# Patient Record
Sex: MALE | Race: WHITE | NOT HISPANIC OR LATINO | Employment: FULL TIME | URBAN - METROPOLITAN AREA
[De-identification: names, ages, dates, MRNs, and addresses within clinical notes are randomized per-mention and may not be internally consistent; named-entity substitution may affect disease eponyms.]

---

## 2023-03-10 LAB — SARS-COV-2 RESULT: NOT DETECTED

## 2023-04-27 DIAGNOSIS — E78.2 MIXED HYPERLIPIDEMIA: Primary | ICD-10-CM

## 2023-04-27 LAB
ANION GAP IN SER/PLAS: 10 MMOL/L (ref 10–20)
CALCIUM (MG/DL) IN SER/PLAS: 9.1 MG/DL (ref 8.6–10.3)
CARBON DIOXIDE, TOTAL (MMOL/L) IN SER/PLAS: 27 MMOL/L (ref 21–32)
CHLORIDE (MMOL/L) IN SER/PLAS: 103 MMOL/L (ref 98–107)
CORTISOL (UG/DL) IN SERUM - AM: 13.9 UG/DL (ref 5–20)
CREATININE (MG/DL) IN SER/PLAS: 0.84 MG/DL (ref 0.5–1.3)
CREATININE (MG/DL) IN URINE: 100 MG/DL (ref 20–370)
GFR MALE: >90 ML/MIN/1.73M2
GLUCOSE (MG/DL) IN SER/PLAS: 91 MG/DL (ref 74–99)
OSMOLALITY, RANDOM URINE: 648 MOSM/KG (ref 200–1200)
OSMOLALITY, SERUM: 285 MOSM/KG H2O (ref 280–300)
POTASSIUM (MMOL/L) IN SER/PLAS: 3.9 MMOL/L (ref 3.5–5.3)
SODIUM (MMOL/L) IN SER/PLAS: 136 MMOL/L (ref 136–145)
SODIUM URINE RANDOM: 110 MMOL/L
SODIUM/CREATININE (MMOL/G) IN URINE: 110 MMOL/G CREAT
THYROTROPIN (MIU/L) IN SER/PLAS BY DETECTION LIMIT <= 0.05 MIU/L: 1.09 MIU/L (ref 0.44–3.98)
UREA NITROGEN (MG/DL) IN SER/PLAS: 17 MG/DL (ref 6–23)

## 2023-04-27 RX ORDER — ATORVASTATIN CALCIUM 10 MG/1
10 TABLET, FILM COATED ORAL NIGHTLY
Qty: 90 TABLET | Refills: 1 | Status: SHIPPED | OUTPATIENT
Start: 2023-04-27 | End: 2023-04-27

## 2023-05-25 LAB
APPEARANCE, URINE: CLEAR
BILIRUBIN, URINE: NEGATIVE
BLOOD, URINE: NEGATIVE
COLOR, URINE: YELLOW
GLUCOSE, URINE: NEGATIVE MG/DL
KETONES, URINE: NEGATIVE MG/DL
LEUKOCYTE ESTERASE, URINE: NEGATIVE
NITRITE, URINE: NEGATIVE
PH, URINE: 7 (ref 5–8)
PROTEIN, URINE: NEGATIVE MG/DL
SPECIFIC GRAVITY, URINE: 1.01 (ref 1–1.03)
UROBILINOGEN, URINE: <2 MG/DL (ref 0–1.9)

## 2023-09-10 LAB — SARS-COV-2 RESULT: DETECTED

## 2024-01-10 ENCOUNTER — LAB REQUISITION (OUTPATIENT)
Dept: LAB | Facility: HOSPITAL | Age: 64
End: 2024-01-10
Payer: COMMERCIAL

## 2024-01-10 LAB — SARS-COV-2 RNA RESP QL NAA+PROBE: DETECTED

## 2024-01-10 PROCEDURE — 87635 SARS-COV-2 COVID-19 AMP PRB: CPT

## 2024-04-01 ENCOUNTER — HOSPITAL ENCOUNTER (EMERGENCY)
Facility: HOSPITAL | Age: 64
Discharge: HOME | End: 2024-04-01
Payer: COMMERCIAL

## 2024-04-01 VITALS
RESPIRATION RATE: 17 BRPM | HEIGHT: 69 IN | WEIGHT: 150 LBS | DIASTOLIC BLOOD PRESSURE: 88 MMHG | HEART RATE: 70 BPM | BODY MASS INDEX: 22.22 KG/M2 | SYSTOLIC BLOOD PRESSURE: 129 MMHG | OXYGEN SATURATION: 100 % | TEMPERATURE: 97 F

## 2024-04-01 DIAGNOSIS — S61.012A LACERATION OF LEFT THUMB WITHOUT FOREIGN BODY WITHOUT DAMAGE TO NAIL, INITIAL ENCOUNTER: Primary | ICD-10-CM

## 2024-04-01 PROCEDURE — 2500000001 HC RX 250 WO HCPCS SELF ADMINISTERED DRUGS (ALT 637 FOR MEDICARE OP): Performed by: NURSE PRACTITIONER

## 2024-04-01 PROCEDURE — 2500000005 HC RX 250 GENERAL PHARMACY W/O HCPCS: Performed by: NURSE PRACTITIONER

## 2024-04-01 PROCEDURE — 99283 EMERGENCY DEPT VISIT LOW MDM: CPT | Mod: 25

## 2024-04-01 PROCEDURE — 12001 RPR S/N/AX/GEN/TRNK 2.5CM/<: CPT

## 2024-04-01 RX ORDER — BACITRACIN ZINC 500 UNIT/G
OINTMENT IN PACKET (EA) TOPICAL ONCE
Status: COMPLETED | OUTPATIENT
Start: 2024-04-01 | End: 2024-04-01

## 2024-04-01 RX ORDER — LIDOCAINE HYDROCHLORIDE AND EPINEPHRINE 10; 10 MG/ML; UG/ML
5 INJECTION, SOLUTION INFILTRATION; PERINEURAL ONCE
Status: COMPLETED | OUTPATIENT
Start: 2024-04-01 | End: 2024-04-01

## 2024-04-01 RX ORDER — ACETAMINOPHEN 325 MG/1
975 TABLET ORAL ONCE
Status: COMPLETED | OUTPATIENT
Start: 2024-04-01 | End: 2024-04-01

## 2024-04-01 RX ORDER — CEPHALEXIN 500 MG/1
500 CAPSULE ORAL 4 TIMES DAILY
Qty: 28 CAPSULE | Refills: 0 | Status: SHIPPED | OUTPATIENT
Start: 2024-04-01 | End: 2024-04-08

## 2024-04-01 RX ADMIN — ACETAMINOPHEN 975 MG: 325 TABLET ORAL at 17:44

## 2024-04-01 RX ADMIN — LIDOCAINE HYDROCHLORIDE,EPINEPHRINE BITARTRATE 5 ML: 10; .01 INJECTION, SOLUTION INFILTRATION; PERINEURAL at 17:45

## 2024-04-01 RX ADMIN — BACITRACIN 1 APPLICATION: 500 OINTMENT TOPICAL at 17:44

## 2024-04-01 ASSESSMENT — PAIN SCALES - GENERAL: PAINLEVEL_OUTOF10: 0 - NO PAIN

## 2024-04-01 ASSESSMENT — COLUMBIA-SUICIDE SEVERITY RATING SCALE - C-SSRS
2. HAVE YOU ACTUALLY HAD ANY THOUGHTS OF KILLING YOURSELF?: NO
1. IN THE PAST MONTH, HAVE YOU WISHED YOU WERE DEAD OR WISHED YOU COULD GO TO SLEEP AND NOT WAKE UP?: NO
6. HAVE YOU EVER DONE ANYTHING, STARTED TO DO ANYTHING, OR PREPARED TO DO ANYTHING TO END YOUR LIFE?: NO

## 2024-04-01 ASSESSMENT — VISUAL ACUITY: OU: 1

## 2024-04-01 ASSESSMENT — PAIN - FUNCTIONAL ASSESSMENT: PAIN_FUNCTIONAL_ASSESSMENT: 0-10

## 2024-04-01 NOTE — DISCHARGE INSTRUCTIONS
After 24 hours, take off the dressing and begin twice daily wound care. Clean twice daily with antibacterial soap and water. Pat dry. Use a small smear of antibiotic ointment Neosporin/Bacitracin or over the counter triple antibiotic cream. Should the wound start to look infected (red, swollen, painful, drainage), take the prescription for antibiotic pills to the pharmacy and start taking the medication. Then call your doctor or clinic as listed for a wound recheck.    LACERATION REPAIR WITH STITCHES PATIENT INSTRUCTIONS:    A laceration is a cut on your skin. It is most often caused by a sharp object like a knife blade, glass, or from other things with sharp edges. Sometimes, this kind of cut is shallow. Other times, it goes deep into the skin and muscles. Before the cut can be closed, it must be cleaned. Closing the wound is called a laceration repair.    Stitches are also called sutures. They are used to close a skin cut or wound. They are often used for cuts that are deep or bleeding. Stitches work well for cuts with jagged edges, or those that have fat or muscle showing. Stitches most often makes the skin heal faster and with less scarring. The skin is sewn together with special kinds of thread. Stitches are used to fix cuts on the outside of the skin. They are also used to fix cuts made in surgery and cuts inside the body. Stitches are also used to help control bleeding. Some stitches need to be taken out. Others melt away or dissolve on their own as the wound heals.    What care is needed at home?   Ask your doctor what you need to do when you go home. Make sure you ask questions if you do not understand what the doctor says. This way you will know what you need to do.  Take your drugs as ordered by doctor.  Talk to your doctor about how to care for your wound. Ask your doctor:  When you should change your bandages. Your doctor may tell you to keep your wound covered to protect it from dirt and germs until it  is fully healed. You may use an antibiotic skin ointment, unless your doctor tells you differently.  When you may take a bath or shower. Most often the doctor will tell you to keep your wound clean and dry for the first 24 hours. After 24 hours, you can gently wash the wound with soap and water or take a shower unless your doctor tells you to care for your wound differently.  If you need to be careful with lifting things over 10 pounds (4.5 kg)  When you may go back to your normal activities like work or driving  Do not try to remove stitches yourself.  Do not rub the wound.  Always wash your hands before and after touching the wound.  Protect the stitches area from getting hit or bumped as this could reopen the wound.    What follow-up care is needed?   Your doctor may ask you to make visits to the office to check on your progress. Be sure to keep these visits.  If you have stitches that do not dissolve on their own, you will need to have them taken out. Your doctor will often want to do this in 5 to 10 days.    Suture removal -- The timing of suture removal varies with the anatomic site.  ?Face - Five days  ?Scalp - 7 to 14 days  ?Trunk and upper extremities - 10-14  ?Lower extremities - 10-14 days    What drugs may be needed?   The doctor may order drugs to:  Help with pain  Prevent or fight an infection    Will physical activity be limited?   Limit your activity until your wound is fully healed. Talk to your doctor about the right amount of activity for you.  Your doctor will tell you when it is safe to return to sports. Be sure to ask your doctor before you do any activities.    What problems could happen?   Bleeding  Infection  Poor healing  Scarring    When do I need to call the doctor?   Signs of would infection. These include a fever of 100.4°F (38°C) or higher; chills; swelling, redness, or warmth around the wound; pain at the cut site that does not go away; yellowish, greenish, or bloody discharge; foul  smell coming from the cut site; cut site opens up.  You are not feeling better in 2 to 3 days or you are feeling worse    INFORMATION FROM UP TO DATE - ALL RIGHTS RESERVED

## 2024-04-01 NOTE — ED PROVIDER NOTES
HPI   Chief Complaint   Patient presents with    L thumb Lac       Patient presents to the emergency department for evaluation of a left thumb injury that occurred a few hours prior to arrival.  Patient was cutting up left over hand with a fillet knife when he accidentally lacerated his left thumb.  He is right-hand dominant, reports his tetanus vaccination to be up-to-date, denies being on anticoagulants and reports taking a baby aspirin this morning.  He has not taken any other over-the-counter intervention for his discomfort.  He was able to control the bleeding with a direct pressure dressing and denies any limited range of motion of his digits secondary to his injury or any foreign body sensation.  His symptoms are moderate in severity and persistent nature.      History provided by:  Patient   used: No                        Radha Coma Scale Score: 15                     Patient History   Past Medical History:   Diagnosis Date    Contact with and (suspected) exposure to tuberculosis 03/17/2020    Exposure to TB    COVID-19 02/07/2021    COVID-19    Disorder of the skin and subcutaneous tissue, unspecified 11/29/2020    Skin lesions    Elevated blood-pressure reading, without diagnosis of hypertension 09/14/2022    Elevated blood pressure reading    Laceration without foreign body of scalp, initial encounter 09/27/2021    Laceration of scalp    Localized swelling, mass and lump, unspecified upper limb 11/29/2020    Axillary lump    Nausea 06/29/2020    Nausea in adult    Other specified personal risk factors, not elsewhere classified 09/13/2021    Risk of exposure to Lyme disease    Pain in left foot 11/03/2021    Left foot pain    Pain in unspecified joint 03/17/2020    Polyarthralgia    Personal history of diseases of the skin and subcutaneous tissue 09/13/2021    History of actinic keratosis    Personal history of other benign neoplasm     History of benign neoplasm    Personal history  of other diseases of the circulatory system 11/28/2021    History of lymphadenitis    Personal history of other diseases of the musculoskeletal system and connective tissue 06/29/2020    History of muscle pain    Personal history of other infectious and parasitic diseases     History of herpes zoster    Personal history of other specified conditions 09/13/2021    History of fatigue    Superficial foreign body, left foot, initial encounter 09/13/2021    Foreign body of foot, infected, left, initial encounter     Past Surgical History:   Procedure Laterality Date    OTHER SURGICAL HISTORY  03/17/2020    Cardiac catheterization    OTHER SURGICAL HISTORY  03/17/2020    Inguinal hernia repair    OTHER SURGICAL HISTORY  03/17/2020    Tumor excision    OTHER SURGICAL HISTORY  03/17/2020    Wrist surgery     No family history on file.  Social History     Tobacco Use    Smoking status: Never    Smokeless tobacco: Never   Vaping Use    Vaping Use: Never used   Substance Use Topics    Alcohol use: Yes     Comment: 2-3 beers a day on the weekend    Drug use: Yes     Types: Marijuana     Comment: This am       Physical Exam   ED Triage Vitals [04/01/24 1507]   Temperature Heart Rate Respirations BP   36.1 °C (97 °F) 70 17 129/88      Pulse Ox Temp src Heart Rate Source Patient Position   100 % -- -- --      BP Location FiO2 (%)     -- --       Physical Exam  Vitals reviewed.   Constitutional:       Appearance: Normal appearance.   HENT:      Head: Normocephalic and atraumatic.      Right Ear: Hearing normal.      Left Ear: Hearing normal.      Nose: Nose normal.      Mouth/Throat:      Lips: Pink.      Mouth: Mucous membranes are moist.   Eyes:      General: Vision grossly intact.   Cardiovascular:      Rate and Rhythm: Normal rate and regular rhythm.      Pulses:           Radial pulses are 2+ on the left side.   Pulmonary:      Effort: Pulmonary effort is normal.      Breath sounds: Normal breath sounds.   Musculoskeletal:       Cervical back: Normal range of motion and neck supple.      Comments: Strong left radial and ulnar pulses.  Normal capillary refill.  Full sensation and motor movement intact along the radial, median and ulnar nerve distribution. Neurovascularly intact with compartments soft.  To the distal, ulnar aspect of the thumb, there is a 1-1/2 cm laceration that abuts to the mid nail without nail involvement.  There is no tendon injury or foreign body.  There is abdi oozing.   Skin:     General: Skin is warm and dry.      Capillary Refill: Capillary refill takes less than 2 seconds.      Findings: Laceration (as documented under musculoskeletal) present.   Neurological:      Mental Status: He is alert and oriented to person, place, and time.         ED Course & MDM   Diagnoses as of 04/01/24 1749   Laceration of left thumb without foreign body without damage to nail, initial encounter       Medical Decision Making  Patient presents for evaluation of a wound. Bleeding is not controlled and patient is not on anticoagulation. There is not clinical evidence warranting diagnostic imaging as there is no tendon injury and no foreign body sensation. Tetanus vaccination is up to date. Plan is for dose of Tylenol, wound repair which he will require some epinephrine in the anesthetic given the bleeding nature of the wound and bacitracin dressing.  Patient understands the rationale of the need to use lidocaine with epi and provides consent    Wound repaired as documented under procedure note. Wound care discussed verbally and provided in written instructions. Plan is for BID wound care, topical ABX, OTC tylenol/motrin as needed and  outpatient follow up for suture removal in 7 days days.  Patient was given a prescription for keflex to watchful wait for S/S of wound infection. If they start ABX, they are to seek a wound recheck in 48-72 hours, sooner as needed. Patient is aware of S/S indicative of re-evaluation in the ER setting.  Patient verbalizes understanding of instructions and departed in stable condition with no social determinants of health that would obscure this treatment plan.         Procedure  TIME: 1730    LACERATION REPAIR - SUTURES    Indication: left thumb laceration  Performed By: Ines Tubbs CNP  Risks, benefits and alternatives for the applicable procedure described. Opportunity for questions and answers provided to allow for informed decision making.  Consent: Verbal consent was obtained by the patient    LOCATION: left thumb, distal ulnar phalanx  LENGTH: 1.5 cm    Anesthesia was obtained by direct infiltration with 1 mL 1% lidocaine with epinephrine. The laceration was removal of existing dressing  cleansing with shur cleanse wound  solution  irrigation with normal saline solution under pressure. The area was prepped and draped in the usual sterile fashion. The wound was explored with No foreign bodies found, No tendon laceration seen. The site was then repaired with 5 5-0 Ethilon sutures with good approximation. A bacitracin dressing was placed over the site. Patient was neurovascularly intact before and after the procedure. They tolerated the procedure well without complications. Wound care, dressing changes, suture removal and return precautions discussed with understanding verbalized.        TERRANCE Enriquez-BAIRON  04/01/24 8621

## 2024-04-01 NOTE — Clinical Note
Tee Randolph was seen and treated in our emergency department on 4/1/2024.  He may return to work on 04/04/2024.       If you have any questions or concerns, please don't hesitate to call.      Ines Tubbs, APRN-CNP

## 2024-05-04 ENCOUNTER — HOSPITAL ENCOUNTER (EMERGENCY)
Facility: HOSPITAL | Age: 64
Discharge: HOME | End: 2024-05-04
Attending: STUDENT IN AN ORGANIZED HEALTH CARE EDUCATION/TRAINING PROGRAM
Payer: COMMERCIAL

## 2024-05-04 VITALS
WEIGHT: 150 LBS | RESPIRATION RATE: 18 BRPM | DIASTOLIC BLOOD PRESSURE: 86 MMHG | SYSTOLIC BLOOD PRESSURE: 122 MMHG | TEMPERATURE: 98.4 F | OXYGEN SATURATION: 98 % | BODY MASS INDEX: 22.22 KG/M2 | HEIGHT: 69 IN | HEART RATE: 78 BPM

## 2024-05-04 DIAGNOSIS — T63.441A BEE STING, ACCIDENTAL OR UNINTENTIONAL, INITIAL ENCOUNTER: Primary | ICD-10-CM

## 2024-05-04 PROCEDURE — 2500000001 HC RX 250 WO HCPCS SELF ADMINISTERED DRUGS (ALT 637 FOR MEDICARE OP): Performed by: STUDENT IN AN ORGANIZED HEALTH CARE EDUCATION/TRAINING PROGRAM

## 2024-05-04 PROCEDURE — 99283 EMERGENCY DEPT VISIT LOW MDM: CPT

## 2024-05-04 RX ORDER — DIPHENHYDRAMINE HCL 25 MG
25 CAPSULE ORAL ONCE
Status: COMPLETED | OUTPATIENT
Start: 2024-05-04 | End: 2024-05-04

## 2024-05-04 RX ORDER — EPINEPHRINE 0.3 MG/.3ML
1 INJECTION SUBCUTANEOUS ONCE AS NEEDED
Qty: 1 EACH | Refills: 0 | Status: SHIPPED | OUTPATIENT
Start: 2024-05-04

## 2024-05-04 RX ORDER — FAMOTIDINE 20 MG/1
20 TABLET, FILM COATED ORAL ONCE
Status: COMPLETED | OUTPATIENT
Start: 2024-05-04 | End: 2024-05-04

## 2024-05-04 RX ADMIN — FAMOTIDINE 20 MG: 20 TABLET ORAL at 14:58

## 2024-05-04 RX ADMIN — DIPHENHYDRAMINE HYDROCHLORIDE 25 MG: 25 CAPSULE ORAL at 14:58

## 2024-05-04 ASSESSMENT — PAIN SCALES - GENERAL
PAINLEVEL_OUTOF10: 0 - NO PAIN
PAINLEVEL_OUTOF10: 0 - NO PAIN

## 2024-05-04 ASSESSMENT — LIFESTYLE VARIABLES
TOTAL SCORE: 0
HAVE PEOPLE ANNOYED YOU BY CRITICIZING YOUR DRINKING: NO
EVER HAD A DRINK FIRST THING IN THE MORNING TO STEADY YOUR NERVES TO GET RID OF A HANGOVER: NO
EVER FELT BAD OR GUILTY ABOUT YOUR DRINKING: NO
HAVE YOU EVER FELT YOU SHOULD CUT DOWN ON YOUR DRINKING: NO

## 2024-05-04 ASSESSMENT — PAIN - FUNCTIONAL ASSESSMENT: PAIN_FUNCTIONAL_ASSESSMENT: 0-10

## 2024-05-04 NOTE — ED PROVIDER NOTES
HPI  Tee Randolph is a 63 y.o. male Presenting after multiple bee stings.  Patient states he was stung on his arms, back, lower abdomen while trying to deal with a swarm of bees.  Patient states this happened about 2 hours prior to presentation.  Patient states he did have some tightness in his throat and feeling like he was having difficulty swallowing.  Patient states that has since improved.  He does endorse hives as well as swelling of his right hand.  Denies any shortness of breath, vomiting, diarrhea.  Patient denies ever having an anaphylactic reaction to bees before.  Patient states he took 2 Benadryl about an hour ago but still endorses itching.  Patient denies any chest pain or shortness of breath.  He denies any wheezing.  Patient does note a cardiac history.    [facility]  Past Medical History:   Diagnosis Date    Contact with and (suspected) exposure to tuberculosis 03/17/2020    Exposure to TB    COVID-19 02/07/2021    COVID-19    Disorder of the skin and subcutaneous tissue, unspecified 11/29/2020    Skin lesions    Elevated blood-pressure reading, without diagnosis of hypertension 09/14/2022    Elevated blood pressure reading    Laceration without foreign body of scalp, initial encounter 09/27/2021    Laceration of scalp    Localized swelling, mass and lump, unspecified upper limb 11/29/2020    Axillary lump    Nausea 06/29/2020    Nausea in adult    Other specified personal risk factors, not elsewhere classified 09/13/2021    Risk of exposure to Lyme disease    Pain in left foot 11/03/2021    Left foot pain    Pain in unspecified joint 03/17/2020    Polyarthralgia    Personal history of diseases of the skin and subcutaneous tissue 09/13/2021    History of actinic keratosis    Personal history of other benign neoplasm     History of benign neoplasm    Personal history of other diseases of the circulatory system 11/28/2021    History of lymphadenitis    Personal history of other diseases of the  "musculoskeletal system and connective tissue 06/29/2020    History of muscle pain    Personal history of other infectious and parasitic diseases     History of herpes zoster    Personal history of other specified conditions 09/13/2021    History of fatigue    Superficial foreign body, left foot, initial encounter 09/13/2021    Foreign body of foot, infected, left, initial encounter       Meds  Current Outpatient Medications   Medication Instructions    atorvastatin (Lipitor) 10 mg tablet TAKE 1 TABLET BY MOUTH ONCE DAILY AT BEDTIME    omeprazole (PriLOSEC) 20 mg DR capsule TAKE 1 CAPSULE BY MOUTH EVERY MORNING BEFORE BREAKFAST       Allergies  Allergies   Allergen Reactions    Isosorbide Headache    Nsaids (Non-Steroidal Anti-Inflammatory Drug) Other     Feels like he has an ulcer; stomach pain        SHx  Social History     Tobacco Use    Smoking status: Never    Smokeless tobacco: Never   Vaping Use    Vaping status: Never Used   Substance Use Topics    Alcohol use: Yes     Comment: 2-3 beers a day on the weekend    Drug use: Yes     Types: Marijuana     Comment: This am       ------------------------------------------------------------------------------------------------------------------------------------------    /78   Pulse (!) 105   Temp 36.9 °C (98.4 °F)   Resp 18   Ht 1.753 m (5' 9\")   Wt 68 kg (150 lb)   SpO2 97%   BMI 22.15 kg/m²     Physical Exam  Constitutional:       General: He is not in acute distress.  HENT:      Head: Normocephalic and atraumatic.      Mouth/Throat:      Mouth: Mucous membranes are moist.      Comments: No edema or erythema of the posterior oropharynx  Eyes:      Extraocular Movements: Extraocular movements intact.      Conjunctiva/sclera: Conjunctivae normal.      Pupils: Pupils are equal, round, and reactive to light.   Cardiovascular:      Rate and Rhythm: Normal rate and regular rhythm.      Heart sounds: No murmur heard.     No gallop.   Pulmonary:      Effort: " Pulmonary effort is normal. No respiratory distress.      Breath sounds: Normal breath sounds. No wheezing.   Abdominal:      General: There is no distension.      Palpations: Abdomen is soft.      Tenderness: There is no abdominal tenderness. There is no guarding.   Musculoskeletal:         General: No swelling or signs of injury. Normal range of motion.   Skin:     General: Skin is warm and dry.      Findings: Rash (Hives as well as scattered bee stings on the arms, chest, abdomen, back) present. No lesion.      Comments: Edema of the right hand.   Neurological:      General: No focal deficit present.      Mental Status: He is alert and oriented to person, place, and time. Mental status is at baseline.   Psychiatric:         Mood and Affect: Mood normal.         Judgment: Judgment normal.          ------------------------------------------------------------------------------------------------------------------------------------------    Medical Decision Making: Patient is a 63-year-old male presenting after bee stings.  Patient is mildly tachycardic on initial presentation but otherwise hemodynamically stable.  At this point in time, patient appears to only have 1 system involvement as he has hives and some swelling of his right hands.  He is no longer endorsing any sensation of his throat swelling or difficulty swallowing.  He has no wheezing, shortness of breath, vomiting or diarrhea.  Given that he only has 1 system involvement at this time and the bee stings happened 2 hours ago, there is low suspicion for anaphylaxis at this time.  However patient did have the sensation that his throat was closing earlier and we will monitor the patient for a few hours to make sure the symptoms do not return.  Patient will be given Benadryl and famotidine.  On reevaluation to 2 hours later, patient had improvement in her symptoms.  He still has mild hives but overall improved.  His swelling is also improved.  No evidence of  the stinger still on the patient.  Patient is requesting discharge at this time.  He will be provided a prescription for an EpiPen.  Strict return precautions were discussed and patient was instructed to follow-up with his primary care provider within the next day.  Patient was discharged home in improved condition with return precautions discussed.  Diagnoses as of 05/04/24 1621   Bee sting, accidental or unintentional, initial encounter     Maggie Wong MD  Emergency Medicine       Maggie Wong MD  05/04/24 1625

## 2024-05-04 NOTE — DISCHARGE INSTRUCTIONS
Please return to the emergency department if you develop shortness of breath, vomiting, sensation of your throat closing.  Please follow-up with your primary care provider within the next few days.

## 2024-05-21 ENCOUNTER — OFFICE VISIT (OUTPATIENT)
Dept: PRIMARY CARE | Facility: CLINIC | Age: 64
End: 2024-05-21
Payer: COMMERCIAL

## 2024-05-21 VITALS
RESPIRATION RATE: 16 BRPM | SYSTOLIC BLOOD PRESSURE: 121 MMHG | BODY MASS INDEX: 21.48 KG/M2 | HEART RATE: 63 BPM | HEIGHT: 69 IN | DIASTOLIC BLOOD PRESSURE: 76 MMHG | OXYGEN SATURATION: 95 % | WEIGHT: 145 LBS

## 2024-05-21 DIAGNOSIS — E87.1 HYPONATREMIA: ICD-10-CM

## 2024-05-21 DIAGNOSIS — R73.03 PREDIABETES: ICD-10-CM

## 2024-05-21 DIAGNOSIS — E78.2 MIXED HYPERLIPIDEMIA: ICD-10-CM

## 2024-05-21 DIAGNOSIS — I47.10 PAROXYSMAL SUPRAVENTRICULAR TACHYCARDIA (CMS-HCC): ICD-10-CM

## 2024-05-21 DIAGNOSIS — I25.10 NONOBSTRUCTIVE ATHEROSCLEROSIS OF CORONARY ARTERY: ICD-10-CM

## 2024-05-21 DIAGNOSIS — R93.1 ELEVATED CORONARY ARTERY CALCIUM SCORE: ICD-10-CM

## 2024-05-21 DIAGNOSIS — M54.2 NECK PAIN: Primary | ICD-10-CM

## 2024-05-21 DIAGNOSIS — E55.9 VITAMIN D DEFICIENCY: ICD-10-CM

## 2024-05-21 DIAGNOSIS — Z53.20 SCREENING FOR HEPATITIS C DECLINED: ICD-10-CM

## 2024-05-21 DIAGNOSIS — Z12.5 SCREENING FOR PROSTATE CANCER: ICD-10-CM

## 2024-05-21 DIAGNOSIS — Z53.20 HIV SCREENING DECLINED: ICD-10-CM

## 2024-05-21 DIAGNOSIS — Z91.030 BEE STING ALLERGY: ICD-10-CM

## 2024-05-21 PROBLEM — R61 GENERALIZED HYPERHIDROSIS: Status: RESOLVED | Noted: 2022-08-31 | Resolved: 2024-05-21

## 2024-05-21 PROBLEM — L57.0 ACTINIC KERATOSIS: Status: RESOLVED | Noted: 2024-05-21 | Resolved: 2024-05-21

## 2024-05-21 PROBLEM — D22.39 MELANOCYTIC NEVI OF OTHER PARTS OF FACE: Status: RESOLVED | Noted: 2023-06-21 | Resolved: 2024-05-21

## 2024-05-21 PROBLEM — S09.90XA CLOSED HEAD INJURY: Status: RESOLVED | Noted: 2024-05-21 | Resolved: 2024-05-21

## 2024-05-21 PROBLEM — I88.9 LYMPHADENITIS: Status: RESOLVED | Noted: 2024-05-21 | Resolved: 2024-05-21

## 2024-05-21 PROBLEM — K59.00 CONSTIPATION: Status: RESOLVED | Noted: 2024-05-21 | Resolved: 2024-05-21

## 2024-05-21 PROBLEM — R10.13 DYSPEPSIA: Status: RESOLVED | Noted: 2024-05-21 | Resolved: 2024-05-21

## 2024-05-21 PROBLEM — K30 INDIGESTION: Status: RESOLVED | Noted: 2024-05-21 | Resolved: 2024-05-21

## 2024-05-21 PROBLEM — H91.93 BILATERAL HEARING LOSS: Status: ACTIVE | Noted: 2024-05-21

## 2024-05-21 PROBLEM — H93.19 TINNITUS: Status: ACTIVE | Noted: 2024-05-21

## 2024-05-21 PROBLEM — N28.9 RENAL LESION: Status: ACTIVE | Noted: 2024-05-21

## 2024-05-21 PROBLEM — H60.543 ECZEMA OF BOTH EXTERNAL EARS: Status: ACTIVE | Noted: 2024-05-21

## 2024-05-21 PROCEDURE — 99214 OFFICE O/P EST MOD 30 MIN: CPT | Performed by: FAMILY MEDICINE

## 2024-05-21 PROCEDURE — 1036F TOBACCO NON-USER: CPT | Performed by: FAMILY MEDICINE

## 2024-05-21 RX ORDER — ASPIRIN 81 MG/1
81 TABLET ORAL DAILY
Start: 2024-05-21 | End: 2025-05-21

## 2024-05-21 RX ORDER — ATORVASTATIN CALCIUM 10 MG/1
10 TABLET, FILM COATED ORAL NIGHTLY
Qty: 90 TABLET | Refills: 1 | Status: SHIPPED | OUTPATIENT
Start: 2024-05-21 | End: 2024-11-17

## 2024-05-21 ASSESSMENT — ENCOUNTER SYMPTOMS
VOMITING: 0
ABDOMINAL PAIN: 0
DYSURIA: 0
SHORTNESS OF BREATH: 0
CONFUSION: 0
DYSPHORIC MOOD: 0
DIZZINESS: 0
NAUSEA: 0
CHEST TIGHTNESS: 0
FEVER: 0
HEADACHES: 0
PALPITATIONS: 0
DIAPHORESIS: 0
NERVOUS/ANXIOUS: 0
CONSTIPATION: 0
SORE THROAT: 0
ADENOPATHY: 0
FREQUENCY: 0
POLYDIPSIA: 0
NECK PAIN: 1
POLYPHAGIA: 0
DIARRHEA: 0
CHILLS: 0
WHEEZING: 0
SINUS PAIN: 0
UNEXPECTED WEIGHT CHANGE: 0
SINUS PRESSURE: 0
LIGHT-HEADEDNESS: 0
HEMATURIA: 0
NUMBNESS: 0
COUGH: 0

## 2024-05-21 NOTE — ASSESSMENT & PLAN NOTE
- check x-ray   - cannot tolerate NSAIDs and does not want to take anything else at this time   - home exercises provided  - refer to Dr. Guerrero for OMT

## 2024-05-21 NOTE — PATIENT INSTRUCTIONS
Tee Randolph ,    Thank you for coming in today. We at [facility] appreciate your trust in our care. If you have any questions or concerns about the care you received today, please do not hesitate to contact us at [phone].    The following instructions were discussed today:    - restart aspirin 81 mg daily   - restart atorvastatin 10 mg daily   - get labs. For blood work: Nothing to eat or drink for at least 10 hours prior. Okay for water or black coffee.   - refer to cardiology   - Follow up in 6 months

## 2024-05-21 NOTE — PROGRESS NOTES
"Subjective   Patient ID: Tee Randolph is a 63 y.o. male who presents for neck pain since september (ER follow up for bee sting, hives and swelling).    HPI   Acute visit. Last time I saw him for routine follow up was Jan. 2023.     Was in ED on 5/4/23 for multiple bee stings. Did experience throat tightening and hives. Benadryl did help. Was also given famotidine. Did have improvement in symptoms. Was sent home with epipen.     Has been having issues with neck pain for the past 8 months or so. No known injury. Pain is left sided. Was radiating down into his shoulder but it is no longer doing that. Denies numbness or tingling. He is put topical pain relievers on it with some relief. He cannot take NSAIDs due to GI upset.     Review of Systems   Constitutional:  Negative for chills, diaphoresis, fever and unexpected weight change.   HENT:  Negative for congestion, sinus pressure, sinus pain, sneezing and sore throat.    Respiratory:  Negative for cough, chest tightness, shortness of breath and wheezing.    Cardiovascular:  Negative for chest pain, palpitations and leg swelling.   Gastrointestinal:  Negative for abdominal pain, constipation, diarrhea, nausea and vomiting.   Endocrine: Negative for cold intolerance, heat intolerance, polydipsia, polyphagia and polyuria.   Genitourinary:  Negative for dysuria, frequency, hematuria and urgency.   Musculoskeletal:  Positive for neck pain.   Neurological:  Negative for dizziness, syncope, light-headedness, numbness and headaches.   Hematological:  Negative for adenopathy.   Psychiatric/Behavioral:  Negative for confusion and dysphoric mood. The patient is not nervous/anxious.        Objective   /76 (BP Location: Right arm, Patient Position: Sitting, BP Cuff Size: Adult)   Pulse 63   Resp 16   Ht 1.753 m (5' 9\")   Wt 65.8 kg (145 lb)   SpO2 95%   BMI 21.41 kg/m²     Physical Exam  Vitals and nursing note reviewed.   Constitutional:       General: He is not in " acute distress.     Appearance: Normal appearance.   HENT:      Head: Normocephalic and atraumatic.      Nose: Nose normal.   Eyes:      Extraocular Movements: Extraocular movements intact.      Conjunctiva/sclera: Conjunctivae normal.      Pupils: Pupils are equal, round, and reactive to light.   Cardiovascular:      Rate and Rhythm: Normal rate and regular rhythm.      Heart sounds: No murmur heard.     No friction rub. No gallop.   Pulmonary:      Effort: Pulmonary effort is normal.      Breath sounds: Normal breath sounds. No wheezing, rhonchi or rales.   Abdominal:      General: Bowel sounds are normal. There is no distension.      Palpations: Abdomen is soft.      Tenderness: There is no abdominal tenderness.   Musculoskeletal:         General: Normal range of motion.      Cervical back: Normal range of motion and neck supple.   Skin:     General: Skin is warm and dry.   Neurological:      General: No focal deficit present.      Mental Status: He is alert and oriented to person, place, and time.      Deep Tendon Reflexes: Reflexes normal.   Psychiatric:         Mood and Affect: Mood normal.         Behavior: Behavior normal.         Thought Content: Thought content normal.         Judgment: Judgment normal.         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 Now is not doing either  - will restart atorvastatin and refer back to cardiology   - restart aspirin 81 mg daily          Relevant Medications    aspirin 81 mg EC tablet    Other Relevant Orders    Vitamin B12    CBC and Auto Differential    Comprehensive Metabolic Panel    TSH with reflex to Free T4 if abnormal    Referral to Cardiology    Hyponatremia E87.1     - saw nephrology, Dr. Gandara  - most recent sodium was 132. This was one year ago  -  check labs          Relevant Orders    Vitamin B12    CBC and Auto Differential    Comprehensive Metabolic Panel    TSH with reflex to Free T4 if abnormal    Mixed hyperlipidemia E78.2     - restart atrovastatin         Relevant Medications    atorvastatin (Lipitor) 10 mg tablet    Other Relevant Orders    Vitamin B12    CBC and Auto Differential    Comprehensive Metabolic Panel    Lipid Panel    TSH with reflex to Free T4 if abnormal    Neck pain - Primary M54.2     - check x-ray   - cannot tolerate NSAIDs and does not want to take anything else at this time   - home exercises provided  - refer to Dr. Guerrero for OMT         Relevant Orders    XR cervical spine complete 4-5 views    Nonobstructive atherosclerosis of coronary artery I25.10     - was following with cardiology and was on atorvastatin. Now is not doing either  - will restart atorvastatin and refer back to cardiology   - restart aspirin 81 mg daily          Relevant Medications    aspirin 81 mg EC tablet    Other Relevant Orders    Vitamin B12    CBC and Auto Differential    Comprehensive Metabolic Panel    TSH with reflex to Free T4 if abnormal    Referral to Cardiology    Paroxysmal supraventricular tachycardia (CMS-HCC) I47.10     - was following with [facility] cardiology but no longer is  - refer back to cardiology          Relevant Orders    Vitamin B12    CBC and Auto Differential    Comprehensive Metabolic Panel    TSH with reflex to Free T4 if abnormal    Referral to Cardiology    Prediabetes R73.03     - Encouraged healthy lifestyle, including adequate exercise and high fiber, low fat and low carb diet.          Relevant Orders    Vitamin B12    Hemoglobin A1C    CBC and Auto Differential    Comprehensive Metabolic Panel    TSH with reflex to Free T4 if abnormal    Vitamin D deficiency E55.9    Relevant Orders    Vitamin B12    Vitamin D 25-Hydroxy,Total (for eval of Vitamin D levels)    CBC and Auto Differential    Comprehensive Metabolic Panel     TSH with reflex to Free T4 if abnormal     Other Visit Diagnoses         Codes    HIV screening declined     Z53.20    Screening for hepatitis C declined     Z53.20    Screening for prostate cancer     Z12.5    Relevant Orders    Prostate Specific Antigen, Screen

## 2024-05-21 NOTE — ASSESSMENT & PLAN NOTE
- was following with cardiology and was on atorvastatin. Now is not doing either  - will restart atorvastatin and refer back to cardiology   - restart aspirin 81 mg daily

## 2024-06-24 ENCOUNTER — APPOINTMENT (OUTPATIENT)
Dept: DERMATOLOGY | Facility: CLINIC | Age: 64
End: 2024-06-24
Payer: COMMERCIAL

## 2024-06-24 DIAGNOSIS — D22.9 MULTIPLE BENIGN NEVI: ICD-10-CM

## 2024-06-24 DIAGNOSIS — L82.1 SEBORRHEIC KERATOSIS: ICD-10-CM

## 2024-06-24 DIAGNOSIS — L57.0 ACTINIC KERATOSIS: Primary | ICD-10-CM

## 2024-06-24 DIAGNOSIS — L81.4 LENTIGO: ICD-10-CM

## 2024-06-24 DIAGNOSIS — D18.01 HEMANGIOMA OF SKIN: ICD-10-CM

## 2024-06-24 PROCEDURE — 99213 OFFICE O/P EST LOW 20 MIN: CPT | Performed by: DERMATOLOGY

## 2024-06-24 PROCEDURE — 17000 DESTRUCT PREMALG LESION: CPT | Performed by: DERMATOLOGY

## 2024-06-24 PROCEDURE — 1036F TOBACCO NON-USER: CPT | Performed by: DERMATOLOGY

## 2024-06-24 RX ORDER — EMTRICITABINE AND TENOFOVIR DISOPROXIL FUMARATE 200; 300 MG/1; MG/1
TABLET, FILM COATED ORAL
COMMUNITY
Start: 2024-06-19

## 2024-06-24 NOTE — PROGRESS NOTES
Subjective     Tee Randolph is a 63 y.o. male who presents for the following: Skin Check (Personal history of Actinic Keratosis. Chaperone offered and declined. ).     Review of Systems:  No other skin or systemic complaints other than what is documented elsewhere in the note.    The following portions of the chart were reviewed this encounter and updated as appropriate:  Tobacco  Allergies  Meds  Problems  Med Hx  Surg Hx  Fam Hx         Skin Cancer History  No skin cancer on file.      Specialty Problems    None       Objective     Well appearing patient in no apparent distress; mood and affect are within normal limits.    A full examination was performed including scalp, face, neck, chest, abdomen, back, bilateral upper extremities, bilateral lower extremities. Patient declines exam underneath the underwear; patient declines exam of the lower abdomen/mons pubis, buttocks, groin, genitalia, perineal and perianal skin and these areas were not examined.    All findings within normal limits unless otherwise noted below.    Assessment/Plan   1. Actinic keratosis  Right Zygomatic Area  Erythematous scaly macule(s)    -Discussed nature of diagnosis and treatment options.   -Patient wishes to proceed with Cryotherapy today  -Possible side effects of liquid nitrogen treatment reviewed including formation of blisters, crusting, tenderness, scar, and discoloration which may be permanent.  -Patient advised to return the office for re-evaluation if the treated lesion(s) do not resolve within 4-6 weeks. Patient verbalizes understanding.    Destr of lesion - Right Zygomatic Area  Complexity: simple    Destruction method: cryotherapy    Informed consent: discussed and consent obtained    Lesion destroyed using liquid nitrogen: Yes    Outcome: patient tolerated procedure well with no complications    Post-procedure details: wound care instructions given      2. Multiple benign nevi  Brown and tan macules and papules with  reassuring findings on dermoscopy    -These lesions have benign, reassuring patterns on dermoscopy  -Recommend continued self observation, and to contact the office if any changes in nevi are noticed    3. Lentigo  Tan macules    -Benign appearing on exam  -Reassurance, recommend observation    4. Seborrheic keratosis  Stuck on, waxy macule(s)/papule(s)/plaque(s) with comedo-like openings and milia like cysts    -Discussed the nature of the diagnosis  -Reassurance, recommend continued observation    5. Hemangioma of skin  Cherry red papules    -Discussed the nature of the diagnosis  -Reassurance, recommend continued observation        Discussed/information given on safe sun practices and use of sunscreen, sun protective clothing or sun avoidance. Recommend to use over the counter medication of sunscreen with a SPF 30 or higher on a daily basis prior to sun exposure to reduce the risk of skin cancer.    Follow up in 1 year for FSE  Discussed if there are any changes or development of concerning symptoms (lesion/skin condition is changing, bleeding, enlarging, or worsening) the patient is to contact my office. The patient verbalizes understanding.     Ying Daley MD  6/24/2024

## 2024-11-18 ENCOUNTER — LAB (OUTPATIENT)
Dept: LAB | Facility: LAB | Age: 64
End: 2024-11-18
Payer: COMMERCIAL

## 2024-11-18 ENCOUNTER — TELEPHONE (OUTPATIENT)
Dept: PRIMARY CARE | Facility: CLINIC | Age: 64
End: 2024-11-18

## 2024-11-18 DIAGNOSIS — I47.10 PAROXYSMAL SUPRAVENTRICULAR TACHYCARDIA (CMS-HCC): ICD-10-CM

## 2024-11-18 DIAGNOSIS — E87.1 HYPONATREMIA: Primary | ICD-10-CM

## 2024-11-18 DIAGNOSIS — R93.1 ELEVATED CORONARY ARTERY CALCIUM SCORE: ICD-10-CM

## 2024-11-18 DIAGNOSIS — E55.9 VITAMIN D DEFICIENCY: ICD-10-CM

## 2024-11-18 DIAGNOSIS — E87.1 HYPONATREMIA: ICD-10-CM

## 2024-11-18 DIAGNOSIS — E78.2 MIXED HYPERLIPIDEMIA: ICD-10-CM

## 2024-11-18 DIAGNOSIS — R73.03 PREDIABETES: ICD-10-CM

## 2024-11-18 DIAGNOSIS — Z12.5 SCREENING FOR PROSTATE CANCER: ICD-10-CM

## 2024-11-18 DIAGNOSIS — I25.10 NONOBSTRUCTIVE ATHEROSCLEROSIS OF CORONARY ARTERY: ICD-10-CM

## 2024-11-18 LAB
25(OH)D3 SERPL-MCNC: 25 NG/ML (ref 30–100)
ALBUMIN SERPL BCP-MCNC: 4.2 G/DL (ref 3.4–5)
ALP SERPL-CCNC: 43 U/L (ref 33–136)
ALT SERPL W P-5'-P-CCNC: 31 U/L (ref 10–52)
ANION GAP SERPL CALC-SCNC: 10 MMOL/L (ref 10–20)
AST SERPL W P-5'-P-CCNC: 26 U/L (ref 9–39)
BASOPHILS # BLD AUTO: 0.04 X10*3/UL (ref 0–0.1)
BASOPHILS NFR BLD AUTO: 0.7 %
BILIRUB SERPL-MCNC: 0.9 MG/DL (ref 0–1.2)
BUN SERPL-MCNC: 9 MG/DL (ref 6–23)
CALCIUM SERPL-MCNC: 9.1 MG/DL (ref 8.6–10.3)
CHLORIDE SERPL-SCNC: 94 MMOL/L (ref 98–107)
CHOLEST SERPL-MCNC: 175 MG/DL (ref 0–199)
CHOLESTEROL/HDL RATIO: 2.4
CO2 SERPL-SCNC: 27 MMOL/L (ref 21–32)
CREAT SERPL-MCNC: 0.74 MG/DL (ref 0.5–1.3)
EGFRCR SERPLBLD CKD-EPI 2021: >90 ML/MIN/1.73M*2
EOSINOPHIL # BLD AUTO: 0.31 X10*3/UL (ref 0–0.7)
EOSINOPHIL NFR BLD AUTO: 5.4 %
ERYTHROCYTE [DISTWIDTH] IN BLOOD BY AUTOMATED COUNT: 11.9 % (ref 11.5–14.5)
EST. AVERAGE GLUCOSE BLD GHB EST-MCNC: 126 MG/DL
GLUCOSE SERPL-MCNC: 102 MG/DL (ref 74–99)
HBA1C MFR BLD: 6 %
HCT VFR BLD AUTO: 39 % (ref 41–52)
HDLC SERPL-MCNC: 71.5 MG/DL
HGB BLD-MCNC: 13.5 G/DL (ref 13.5–17.5)
IMM GRANULOCYTES # BLD AUTO: 0.01 X10*3/UL (ref 0–0.7)
IMM GRANULOCYTES NFR BLD AUTO: 0.2 % (ref 0–0.9)
LDLC SERPL CALC-MCNC: 89 MG/DL
LYMPHOCYTES # BLD AUTO: 1.66 X10*3/UL (ref 1.2–4.8)
LYMPHOCYTES NFR BLD AUTO: 28.9 %
MCH RBC QN AUTO: 31.6 PG (ref 26–34)
MCHC RBC AUTO-ENTMCNC: 34.6 G/DL (ref 32–36)
MCV RBC AUTO: 91 FL (ref 80–100)
MONOCYTES # BLD AUTO: 0.46 X10*3/UL (ref 0.1–1)
MONOCYTES NFR BLD AUTO: 8 %
NEUTROPHILS # BLD AUTO: 3.27 X10*3/UL (ref 1.2–7.7)
NEUTROPHILS NFR BLD AUTO: 56.8 %
NON HDL CHOLESTEROL: 104 MG/DL (ref 0–149)
NRBC BLD-RTO: 0 /100 WBCS (ref 0–0)
PLATELET # BLD AUTO: 310 X10*3/UL (ref 150–450)
POTASSIUM SERPL-SCNC: 3.9 MMOL/L (ref 3.5–5.3)
PROT SERPL-MCNC: 7 G/DL (ref 6.4–8.2)
PSA SERPL-MCNC: 0.74 NG/ML
RBC # BLD AUTO: 4.27 X10*6/UL (ref 4.5–5.9)
SODIUM SERPL-SCNC: 127 MMOL/L (ref 136–145)
TRIGL SERPL-MCNC: 72 MG/DL (ref 0–149)
TSH SERPL-ACNC: 1.45 MIU/L (ref 0.44–3.98)
VIT B12 SERPL-MCNC: 444 PG/ML (ref 211–911)
VLDL: 14 MG/DL (ref 0–40)
WBC # BLD AUTO: 5.8 X10*3/UL (ref 4.4–11.3)

## 2024-11-18 PROCEDURE — 80061 LIPID PANEL: CPT

## 2024-11-18 PROCEDURE — 80053 COMPREHEN METABOLIC PANEL: CPT

## 2024-11-18 PROCEDURE — 82306 VITAMIN D 25 HYDROXY: CPT

## 2024-11-18 PROCEDURE — 82607 VITAMIN B-12: CPT

## 2024-11-18 PROCEDURE — 85025 COMPLETE CBC W/AUTO DIFF WBC: CPT

## 2024-11-18 PROCEDURE — 84443 ASSAY THYROID STIM HORMONE: CPT

## 2024-11-18 PROCEDURE — 83036 HEMOGLOBIN GLYCOSYLATED A1C: CPT

## 2024-11-18 PROCEDURE — 36415 COLL VENOUS BLD VENIPUNCTURE: CPT

## 2024-11-18 PROCEDURE — 84153 ASSAY OF PSA TOTAL: CPT

## 2024-11-18 NOTE — TELEPHONE ENCOUNTER
Please let patient know his sodium is low. I would like to recheck. Order placed. Does he still see nephrology?

## 2024-11-19 NOTE — TELEPHONE ENCOUNTER
He hasn't seen the nephrologist in quite some time, he is having pain on the lower left part of his back.  I advised he call and make another appt to be seen.

## 2024-11-20 ENCOUNTER — LAB (OUTPATIENT)
Dept: LAB | Facility: LAB | Age: 64
End: 2024-11-20
Payer: COMMERCIAL

## 2024-11-20 DIAGNOSIS — E87.1 HYPONATREMIA: ICD-10-CM

## 2024-11-20 LAB
ANION GAP SERPL CALC-SCNC: 10 MMOL/L (ref 10–20)
BUN SERPL-MCNC: 10 MG/DL (ref 6–23)
CALCIUM SERPL-MCNC: 9.1 MG/DL (ref 8.6–10.3)
CHLORIDE SERPL-SCNC: 93 MMOL/L (ref 98–107)
CO2 SERPL-SCNC: 28 MMOL/L (ref 21–32)
CREAT SERPL-MCNC: 0.73 MG/DL (ref 0.5–1.3)
EGFRCR SERPLBLD CKD-EPI 2021: >90 ML/MIN/1.73M*2
GLUCOSE SERPL-MCNC: 93 MG/DL (ref 74–99)
POTASSIUM SERPL-SCNC: 4.2 MMOL/L (ref 3.5–5.3)
SODIUM SERPL-SCNC: 127 MMOL/L (ref 136–145)

## 2024-11-20 PROCEDURE — 36415 COLL VENOUS BLD VENIPUNCTURE: CPT

## 2024-11-20 PROCEDURE — 80048 BASIC METABOLIC PNL TOTAL CA: CPT

## 2024-11-21 ENCOUNTER — APPOINTMENT (OUTPATIENT)
Dept: PRIMARY CARE | Facility: CLINIC | Age: 64
End: 2024-11-21
Payer: COMMERCIAL

## 2024-11-21 ENCOUNTER — TELEPHONE (OUTPATIENT)
Dept: PRIMARY CARE | Facility: CLINIC | Age: 64
End: 2024-11-21

## 2024-11-21 DIAGNOSIS — E87.1 HYPONATREMIA: Primary | ICD-10-CM

## 2024-11-21 NOTE — TELEPHONE ENCOUNTER
Please let patient know his sodium is still low. I recommend he see nephrology. Does he need a new referral? I also recommend he restrict his fluid intake to 50 ounces per day. Repeat BMP in 1 week.

## 2024-12-09 ENCOUNTER — LAB (OUTPATIENT)
Dept: LAB | Facility: LAB | Age: 64
End: 2024-12-09
Payer: COMMERCIAL

## 2024-12-09 DIAGNOSIS — E87.1 HYPO-OSMOLALITY AND HYPONATREMIA: ICD-10-CM

## 2024-12-09 DIAGNOSIS — E87.1 HYPO-OSMOLALITY AND HYPONATREMIA: Primary | ICD-10-CM

## 2024-12-09 LAB
ANION GAP SERPL CALC-SCNC: 11 MMOL/L (ref 10–20)
BUN SERPL-MCNC: 17 MG/DL (ref 6–23)
CALCIUM SERPL-MCNC: 8.8 MG/DL (ref 8.6–10.3)
CHLORIDE SERPL-SCNC: 97 MMOL/L (ref 98–107)
CO2 SERPL-SCNC: 26 MMOL/L (ref 21–32)
CREAT SERPL-MCNC: 0.93 MG/DL (ref 0.5–1.3)
CREAT UR-MCNC: 87.6 MG/DL (ref 20–370)
EGFRCR SERPLBLD CKD-EPI 2021: >90 ML/MIN/1.73M*2
GLUCOSE SERPL-MCNC: 101 MG/DL (ref 74–99)
OSMOLALITY SERPL: 278 MOSM/KG (ref 280–300)
OSMOLALITY UR: 630 MOSM/KG (ref 200–1200)
POTASSIUM SERPL-SCNC: 4.5 MMOL/L (ref 3.5–5.3)
SODIUM SERPL-SCNC: 129 MMOL/L (ref 136–145)
SODIUM UR-SCNC: 67 MMOL/L
SODIUM/CREAT UR-RTO: 76 MMOL/G CREAT

## 2024-12-09 PROCEDURE — 84300 ASSAY OF URINE SODIUM: CPT

## 2024-12-09 PROCEDURE — 83935 ASSAY OF URINE OSMOLALITY: CPT

## 2024-12-09 PROCEDURE — 83930 ASSAY OF BLOOD OSMOLALITY: CPT

## 2024-12-09 PROCEDURE — 36415 COLL VENOUS BLD VENIPUNCTURE: CPT

## 2024-12-09 PROCEDURE — 80048 BASIC METABOLIC PNL TOTAL CA: CPT

## 2024-12-09 PROCEDURE — 82570 ASSAY OF URINE CREATININE: CPT

## 2025-02-19 ENCOUNTER — HOSPITAL ENCOUNTER (OUTPATIENT)
Dept: RADIOLOGY | Facility: HOSPITAL | Age: 65
Discharge: HOME | End: 2025-02-19
Payer: COMMERCIAL

## 2025-02-19 DIAGNOSIS — M25.552 PAIN IN LEFT HIP: ICD-10-CM

## 2025-02-19 DIAGNOSIS — M54.51 VERTEBROGENIC LOW BACK PAIN: ICD-10-CM

## 2025-02-19 PROCEDURE — 73502 X-RAY EXAM HIP UNI 2-3 VIEWS: CPT | Mod: LT

## 2025-02-19 PROCEDURE — 72110 X-RAY EXAM L-2 SPINE 4/>VWS: CPT

## 2025-06-30 ENCOUNTER — APPOINTMENT (OUTPATIENT)
Dept: DERMATOLOGY | Facility: CLINIC | Age: 65
End: 2025-06-30
Payer: COMMERCIAL

## 2025-06-30 DIAGNOSIS — L82.1 SEBORRHEIC KERATOSIS: ICD-10-CM

## 2025-06-30 DIAGNOSIS — L81.4 LENTIGO: ICD-10-CM

## 2025-06-30 DIAGNOSIS — Z12.83 SCREENING EXAM FOR SKIN CANCER: ICD-10-CM

## 2025-06-30 DIAGNOSIS — D22.9 MULTIPLE BENIGN NEVI: Primary | ICD-10-CM

## 2025-06-30 DIAGNOSIS — Z87.2 HISTORY OF ACTINIC KERATOSIS: ICD-10-CM

## 2025-06-30 DIAGNOSIS — D18.01 HEMANGIOMA OF SKIN: ICD-10-CM

## 2025-06-30 PROCEDURE — 99213 OFFICE O/P EST LOW 20 MIN: CPT | Performed by: DERMATOLOGY

## 2025-06-30 PROCEDURE — 1036F TOBACCO NON-USER: CPT | Performed by: DERMATOLOGY

## 2025-06-30 RX ORDER — METHOCARBAMOL 500 MG/1
TABLET, FILM COATED ORAL
COMMUNITY
Start: 2025-02-14

## 2025-06-30 RX ORDER — MELOXICAM 7.5 MG/1
TABLET ORAL
COMMUNITY
Start: 2025-02-14

## 2025-06-30 NOTE — PROGRESS NOTES
Subjective     Tee Randolph is a 64 y.o. male who presents for the following: Skin Check (Personal history of Actinic Keratosis. Chaperone offered and declined.  ).     Review of Systems:  No other skin or systemic complaints other than what is documented elsewhere in the note.    The following portions of the chart were reviewed this encounter and updated as appropriate:  Tobacco  Allergies  Meds  Problems  Med Hx  Surg Hx  Fam Hx              Objective     Well appearing patient in no apparent distress; mood and affect are within normal limits.    A full examination was performed including scalp, face, neck, chest, abdomen, back, bilateral upper extremities, bilateral lower extremities. Patient declines exam underneath the underwear; patient declines exam of the lower abdomen/mons pubis, buttocks, groin, genitalia, perineal and perianal skin and these areas were not examined.      All findings within normal limits unless otherwise noted below.    Assessment/Plan   Skin Exam  1. MULTIPLE BENIGN NEVI  Generalized  Brown and tan macules and papules with reassuring findings on dermoscopy  -These lesions have benign, reassuring patterns on dermoscopy  -Recommend continued self observation, and to contact the office if any changes in nevi are noticed  2. LENTIGO  Generalized  Tan macules  -Benign appearing on exam  -Reassurance, recommend observation  3. SEBORRHEIC KERATOSIS  Generalized  Stuck on, waxy macule(s)/papule(s)/plaque(s) with comedo-like openings and milia like cysts  -Discussed the nature of the diagnosis  -Reassurance, recommend continued observation  4. HEMANGIOMA OF SKIN  Generalized  Cherry red papules  -Discussed the nature of the diagnosis  -Reassurance, recommend continued observation  5. HISTORY OF ACTINIC KERATOSIS  Generalized  6. SCREENING EXAM FOR SKIN CANCER  Generalized    Discussed/information given on safe sun practices and use of sunscreen, sun protective clothing or sun avoidance.  Recommend to use over the counter medication of sunscreen with a SPF 30 or higher on a daily basis prior to sun exposure to reduce the risk of skin cancer.    Follow up in 1 year for FSE  Discussed if there are any changes or development of concerning symptoms (lesion/skin condition is changing, bleeding, enlarging, or worsening) the patient is to contact my office. The patient verbalizes understanding.     Ying Daley MD  6/30/2025